# Patient Record
(demographics unavailable — no encounter records)

---

## 2025-01-06 NOTE — REVIEW OF SYSTEMS
[Fever] : no fever [Chills] : no chills [Fatigue] : no fatigue [Chest Pain] : no chest pain [Palpitations] : no palpitations [Lower Ext Edema] : no lower extremity edema [Orthopnea] : no orthopnea [Dysuria] : no dysuria [Hematuria] : no hematuria [Joint Pain] : joint pain [Muscle Pain] : muscle pain [Back Pain] : back pain [Itching] : no itching [Skin Rash] : no skin rash [Headache] : no headache [Dizziness] : no dizziness [Unsteady Walking] : no ataxia [Easy Bleeding] : no easy bleeding [Easy Bruising] : no easy bruising [Swollen Glands] : no swollen glands [Negative] : Gastrointestinal

## 2025-01-06 NOTE — PLAN
[FreeTextEntry1] :    #Back pain w/ radiculopathy, Hx of herniated discs s/p microdiscectomy w/ reoccurrence  Follows with Dr Rizvi, ortho/spine Declines oral steroid Reports she has Meloxicam 10mg at home may take daily x 10 days with food Start Flexeril 5mg po q 8 hours PRN MRI thoracic, lumbar and sacral spine F/U with Dr Rizvi

## 2025-01-06 NOTE — HISTORY OF PRESENT ILLNESS
[FreeTextEntry8] : 63F w/ PMHx of BCC, SCC, PreDM, Osteoporosis on Prolia, Herniated discs s/p microdiscectomy w/ reoccurrence presents to office with c/o intermittent back pain x several weeks. Pt reports she sustained a mechanical fall two weeks ago landing on her right side. She reports the back pain is primarily on her left side with radiculopathy and can sometimes radiate or feel like a band across her thoracic spine.

## 2025-01-06 NOTE — PHYSICAL EXAM
[No Acute Distress] : no acute distress [Normal Sclera/Conjunctiva] : normal sclera/conjunctiva [Normal Outer Ear/Nose] : the outer ears and nose were normal in appearance [No JVD] : no jugular venous distention [No Lymphadenopathy] : no lymphadenopathy [Supple] : supple [No Respiratory Distress] : no respiratory distress  [No Accessory Muscle Use] : no accessory muscle use [Clear to Auscultation] : lungs were clear to auscultation bilaterally [Normal Rate] : normal rate  [Regular Rhythm] : with a regular rhythm [Normal S1, S2] : normal S1 and S2 [Pedal Pulses Present] : the pedal pulses are present [No Edema] : there was no peripheral edema [Soft] : abdomen soft [Normal Anterior Cervical Nodes] : no anterior cervical lymphadenopathy [No CVA Tenderness] : no CVA  tenderness [No Spinal Tenderness] : no spinal tenderness [No Joint Swelling] : no joint swelling [Grossly Normal Strength/Tone] : grossly normal strength/tone [No Rash] : no rash [Coordination Grossly Intact] : coordination grossly intact [No Focal Deficits] : no focal deficits [Normal Gait] : normal gait [Normal Affect] : the affect was normal [Alert and Oriented x3] : oriented to person, place, and time [Normal Insight/Judgement] : insight and judgment were intact

## 2025-01-06 NOTE — HEALTH RISK ASSESSMENT
[Any fall with injury in past year] : Patient reported fall with injury in the past year [0] : 2) Feeling down, depressed, or hopeless: Not at all (0) [PHQ-2 Negative - No further assessment needed] : PHQ-2 Negative - No further assessment needed [[id number]] : 0

## 2025-01-09 NOTE — PHYSICAL EXAM
[No Spinal Tenderness] : no spinal tenderness [General Appearance - Alert] : alert [General Appearance - In No Acute Distress] : in no acute distress [Sclera] : the sclera and conjunctiva were normal [PERRL With Normal Accommodation] : pupils were equal in size, round, and reactive to light [Extraocular Movements] : extraocular movements were intact [Outer Ear] : the ears and nose were normal in appearance [Oropharynx] : the oropharynx was normal [Neck Appearance] : the appearance of the neck was normal [Neck Cervical Mass (___cm)] : no neck mass was observed [Jugular Venous Distention Increased] : there was no jugular-venous distention [Thyroid Diffuse Enlargement] : the thyroid was not enlarged [Thyroid Nodule] : there were no palpable thyroid nodules [Auscultation Breath Sounds / Voice Sounds] : lungs were clear to auscultation bilaterally [Heart Rate And Rhythm] : heart rate was normal and rhythm regular [Heart Sounds] : normal S1 and S2 [Heart Sounds Gallop] : no gallops [Murmurs] : no murmurs [Heart Sounds Pericardial Friction Rub] : no pericardial rub [Arterial Pulses Carotid] : carotid pulses were normal with no bruits [Bowel Sounds] : normal bowel sounds [Abdomen Soft] : soft [Abdomen Tenderness] : non-tender [Abdomen Mass (___ Cm)] : no abdominal mass palpated [Cervical Lymph Nodes Enlarged Posterior Bilaterally] : posterior cervical [Cervical Lymph Nodes Enlarged Anterior Bilaterally] : anterior cervical [Supraclavicular Lymph Nodes Enlarged Bilaterally] : supraclavicular [Nail Clubbing] : no clubbing  or cyanosis of the fingernails [] : no rash [Coordination Grossly Intact] : coordination grossly intact [Normal Gait] : normal gait [Normal Affect] : the affect was normal [Normal Insight/Judgement] : insight and judgment were intact [de-identified] : There is a well-healed scar at the base of the spine [Edema] : there was no peripheral edema [FreeTextEntry1] : There is decreased range of motion of the right shoulder with discomfort upon movement.

## 2025-01-09 NOTE — HEALTH RISK ASSESSMENT
[Yes] : Yes [Monthly or less (1 pt)] : Monthly or less (1 point) [1 or 2 (0 pts)] : 1 or 2 (0 points) [Never (0 pts)] : Never (0 points) [No] : In the past 12 months have you used drugs other than those required for medical reasons? No [0] : 2) Feeling down, depressed, or hopeless: Not at all (0) [PHQ-2 Negative - No further assessment needed] : PHQ-2 Negative - No further assessment needed [Never] : Never [Patient reported mammogram was abnormal] : Patient reported mammogram was abnormal [Patient reported PAP Smear was normal] : Patient reported PAP Smear was normal [Patient reported bone density results were abnormal] : Patient reported bone density results were abnormal [Patient reported colonoscopy was normal] : Patient reported colonoscopy was normal [MammogramDate] : 8/2024 [MammogramComments] : on 6mos foll-up [PapSmearDate] : 8/2024 [BoneDensityDate] : 2023 [BoneDensityComments] : osteoporosis [ColonoscopyDate] : 2023 [ColonoscopyComments] : 2 years ago

## 2025-01-09 NOTE — ADDENDUM
[FreeTextEntry1] : This note was written by Erin Cho on 01/09/2025 acting as a medical scribe for Dr. Nathaniel Chua MD. All medical entries made by the scribe were at my, Dr. Nathaniel Chua's, direction and personally dictated by me on 01/09/2025. I have reviewed the chart and agree that the record accurately reflects my personal performance of the history, review of systems, assessment, and plan. I have also personally directed, reviewed, and agreed with the chart.

## 2025-01-09 NOTE — HISTORY OF PRESENT ILLNESS
[FreeTextEntry1] :  The patient comes in for a yearly wellness evaluation. [de-identified] : The patient is feeling well at this time. She has a history of Hyperlipidemia and continues to take Atorvastatin 40 MG once daily. She is generally tolerating the statin agent well, denying any severe muscle aches or any other overt symptoms. She continues to follow with cardiologist, Dr. Freitas. There has been no chest pain, shortness of breath, palpitations, or PND. She exercises routinely, 4-5x/week namely weightlifting, pickleball, and walking.   The patient has a history of chronic back pain for which she follows with Dr. Rizvi. She is maintained on Cyclobenzaprine 5 MG TID PRN. She notes that she saw LISSET Hunter on 1/6/25, c/o back pain primarily on her left side with radiculopathy which would sometimes radiate or feel like a band across her thoracic spine. She does note that she sustained a mechanical fall approximately 2 weeks ago, landing on her right side. LISSET Hunter ordered an MRI of the cervical, thoracic, and lumbar spine. She has not yet had the scan done but notes that it is scheduled for next week.  She will follow-up with her neurosurgeon, Dr. Rizvi as well.  She does report that she has returned to baseline in this regard.   The patient continues to follow with endocrinologist, Dr. Colemnares, for treatment of osteoporosis. She receives Prolia injections every 6 months. She is set to undergo a follow up bone density scan later this year.   She reports that she is up to date on her dermatology evaluation, noting that Dr. Lomeli removed both squamous and basal cell cancer lesions. There have been no cough, fevers, chills, or night sweats. There have been no other acute constitutional symptoms. She comes in for this assessment.  The patient does consume alcohol on a regular basis. She does wear a seatbelt when in a motor vehicle. There is no evidence of depression or suicidal ideations. The patient reports a very social life and denies any loneliness or isolation. She denies any difficulty carrying out daily activities such as dressing and grooming. She also denies any difficulty ambulating on a daily basis. Additionally, she denies any issues performing instrumental daily activities such as using the phone, doing laundry, housekeeping, driving, managing medications, shopping, or handling finances.  Last CPE: 1/8/24  GYN Exam: 8/2024  Mammogram: 8/2024  Colonoscopy: 2023  Ophthalmology: n/a  Dermatology: up to date  Dentist: n/a  Flu: up to date  Tdap: due in December  COVID: due  Diet: regular  Exercise: exercises routinely

## 2025-01-09 NOTE — PLAN
[FreeTextEntry1] : 1. Continue current medications as outlined above.  2. The patient received the Prevnar 20 vaccine in office today, 1/9/25.   3. The patient will undergo routine fasting bloodwork in the near future.    4. Follow up in 1 year with wellness and routine fasting bloodwork. She will receive the TDAP booster at that time.    5. Continue to follow with cardiologist, Dr. Freitas, for treatment of HLD.   6. Continue to follow with endocrinologist, Dr. Colmenares, for treatment of osteoporosis.  7. Continue to follow with ortho/spine Dr. Rizvi for treatment of chronic back pain. She is set to undergo an MRI of the spine next week.   8. Continue to follow with dermatologist for total body skin exam.   9. Continue to follow with GYN for annual evaluation.    10. Maintain exercise regimen as tolerated.

## 2025-02-04 NOTE — HISTORY OF PRESENT ILLNESS
[FreeTextEntry1] : Patient presents for skin examination. [de-identified] : Denies new, changing, bleeding or tender lesions on the skin over the past 6 months.

## 2025-02-05 NOTE — CONSULT LETTER
[Dear  ___] : Dear  [unfilled], [Courtesy Letter:] : I had the pleasure of seeing your patient, [unfilled], in my office today. [Sincerely,] : Sincerely, [FreeTextEntry2] : Nathaniel Chua  [address] [place], [place] [id number] [FreeTextEntry1] :  Subjective:   - Summary: 63-year-old female patient with a history of right-sided L5S1 microdiscectomy in 2021 for a large disc extrusion shows complaints of recurring lower back pain and right-sided sciatica. The symptoms tend to exacerbate when she carries heavy objects, stands for long durations and commence exercise but mitigate upon enduring movement. She describes a dip in her quality of life due to the constant pain but denies any numbness and functional limitations.   - Chief Complaint (CC): Recurrent lower back pain with right-sided sciatica.   - History of Present Illness (HPI): Patient had a right-sided L5S1 microdiscectomy in 2021, but continued to experience back pain and right-sided sciatica. The pain is described as a lower ache with periodic shooting pain along the back of the leg and at the ankle, more so at the beginning of the exercise regimen that generally diminishes as the workout continues.   - Past Medical History: The patient underwent a right-sided L5S1 microdiscectomy in 2021 for a large disc extrusion.   - Past Surgical History: Right-sided L5S1 microdiscectomy performed in 2021 for large disc extrusion.   - Family History: Not mentioned.   - Social History: The patient does not indulge in running but walks regularly. She leads an active lifestyle and is involved in hiking.   - Review of Systems: Not mentioned.   - Medications: Previously on Meloxicam and Naproxen.   - Allergies: Not mentioned.   Objective:   - Diagnostic Results: The MRI shows post changes at L5-S1 and bilateral foraminal stenosis, more profound on the right side. The loss of disc height has led to foraminal stenosis where the nerve root exits. The condition is apparently more on the left side due to the right-sided microdiscectomy performed earlier, which opened the foramina on that side.   - Vital Signs: Not mentioned.   - Physical Examination (PE): Assessment of foot strength was performed. The patient was able to dorsiflex the left foot and resisted against resistance. Palpation of the ligaments and tendons in the foot did not trigger the patient's usual pain.   Assessment:   - Summary: The evidence suggests an increase in foraminal stenosis at the L5-S1 level after discectomy, which appears to be the cause of the patient's recurrent symptoms of lower back pain and sciatica. The weight-bearing impact on the lumbosacral joint might be triggering nerve irritation, leading to the patient's symptoms.   - Problems:     - Increased foraminal stenosis at the L5-S1 level   - Differential Diagnosis:     - Sciatica     - Disc Herniation     - Neuropathy   Plan:   - Summary: The patient is to continue with beneficial exercises and posterior core strengthening. Avoid connective activities that escalate downward pressure. The implementation of a strategic epidural injection can yield months of relief without an immediate need for invasive interventions. The patient is not recommended surgical fusion. The situation is not detrimentally grave and does not call for a commitment to any non-fusion options as well. Patient will be connected to a pain management practitioner if needed.   - Plan:     - Continuation of beneficial exercises and core strengthening     - Consideration of strategic epidural injection     - Potential consultation with pain management practitioners if necessary     - Avoidance of high-impact activities     - Continuation of either Meloxicam or Naproxen for inflammation   [FreeTextEntry3] : Freddy Rizvi MD, PhD, FRCPSC   Attending Neurosurgeon   of Neurosurgery  [facility]  [address], [address]nd [place] [address]  Office: [phone]  Fax: [phone]

## 2025-02-05 NOTE — CONSULT LETTER
[Dear  ___] : Dear  [unfilled], [Courtesy Letter:] : I had the pleasure of seeing your patient, [unfilled], in my office today. [Sincerely,] : Sincerely, [FreeTextEntry2] : Nathaniel Chua  [address]  [address] [place], [place] [id number] [FreeTextEntry1] :  Subjective:   - Summary: 63-year-old female patient with a history of right-sided L5S1 microdiscectomy in 2021 for a large disc extrusion shows complaints of recurring lower back pain and right-sided sciatica. The symptoms tend to exacerbate when she carries heavy objects, stands for long durations and commence exercise but mitigate upon enduring movement. She describes a dip in her quality of life due to the constant pain but denies any numbness and functional limitations.   - Chief Complaint (CC): Recurrent lower back pain with right-sided sciatica.   - History of Present Illness (HPI): Patient had a right-sided L5S1 microdiscectomy in 2021, but continued to experience back pain and right-sided sciatica. The pain is described as a lower ache with periodic shooting pain along the back of the leg and at the ankle, more so at the beginning of the exercise regimen that generally diminishes as the workout continues.   - Past Medical History: The patient underwent a right-sided L5S1 microdiscectomy in 2021 for a large disc extrusion.   - Past Surgical History: Right-sided L5S1 microdiscectomy performed in 2021 for large disc extrusion.   - Family History: Not mentioned.   - Social History: The patient does not indulge in running but walks regularly. She leads an active lifestyle and is involved in hiking.   - Review of Systems: Not mentioned.   - Medications: Previously on Meloxicam and Naproxen.   - Allergies: Not mentioned.   Objective:   - Diagnostic Results: The MRI shows post changes at L5-S1 and bilateral foraminal stenosis, more profound on the right side. The loss of disc height has led to foraminal stenosis where the nerve root exits. The condition is apparently more on the left side due to the right-sided microdiscectomy performed earlier, which opened the foramina on that side.   - Vital Signs: Not mentioned.   - Physical Examination (PE): Assessment of foot strength was performed. The patient was able to dorsiflex the left foot and resisted against resistance. Palpation of the ligaments and tendons in the foot did not trigger the patient's usual pain.   Assessment:   - Summary: The evidence suggests an increase in foraminal stenosis at the L5-S1 level after discectomy, which appears to be the cause of the patient's recurrent symptoms of lower back pain and sciatica. The weight-bearing impact on the lumbosacral joint might be triggering nerve irritation, leading to the patient's symptoms.   - Problems:     - Increased foraminal stenosis at the L5-S1 level   - Differential Diagnosis:     - Sciatica     - Disc Herniation     - Neuropathy   Plan:   - Summary: The patient is to continue with beneficial exercises and posterior core strengthening. Avoid connective activities that escalate downward pressure. The implementation of a strategic epidural injection can yield months of relief without an immediate need for invasive interventions. The patient is not recommended surgical fusion. The situation is not detrimentally grave and does not call for a commitment to any non-fusion options as well. Patient will be connected to a pain management practitioner if needed.   - Plan:     - Continuation of beneficial exercises and core strengthening     - Consideration of strategic epidural injection     - Potential consultation with pain management practitioners if necessary     - Avoidance of high-impact activities     - Continuation of either Meloxicam or Naproxen for inflammation   [FreeTextEntry3] : Freddy Rizvi MD, PhD, FRCPSC   Attending Neurosurgeon   of Neurosurgery  [facility]  [address], [address] [place] [address]  Office: [phone]  Fax: [phone]

## 2025-05-19 NOTE — PHYSICAL EXAM
[No Acute Distress] : no acute distress [No Lymphadenopathy] : no lymphadenopathy [Supple] : supple [No Respiratory Distress] : no respiratory distress  [No Accessory Muscle Use] : no accessory muscle use [Clear to Auscultation] : lungs were clear to auscultation bilaterally [Normal Rate] : normal rate  [Regular Rhythm] : with a regular rhythm [Normal S1, S2] : normal S1 and S2 [No Edema] : there was no peripheral edema [Soft] : abdomen soft [Non Tender] : non-tender [Non-distended] : non-distended [Normal Bowel Sounds] : normal bowel sounds [Normal Gait] : normal gait [Normal Affect] : the affect was normal [Alert and Oriented x3] : oriented to person, place, and time [de-identified] : +Stitches behind L ear, healing well, no purulent drainage noted

## 2025-05-19 NOTE — PLAN
[FreeTextEntry1] : Area cleansed with NS and peroxide  No sign of infection, appears healing well Pt to continue with PO abx will send mupirocin to apply topically RTO in 10 days for suture removal

## 2025-05-19 NOTE — HISTORY OF PRESENT ILLNESS
[FreeTextEntry1] : Urgent care F/U [de-identified] : The patient is a 64- year-old female with PMH of HLD, sciatica who presents to office today for UC F/U.  Patient sustained fall over the weekend - slipped on kitchen floor and hit head on granite countertop. Patient was seen at  and stitches were placed behind L ear. Patient also started on cefadroxil, tolerating well.  No fever, chills, purulent drainage noted